# Patient Record
Sex: FEMALE | Race: WHITE | Employment: STUDENT | URBAN - METROPOLITAN AREA
[De-identification: names, ages, dates, MRNs, and addresses within clinical notes are randomized per-mention and may not be internally consistent; named-entity substitution may affect disease eponyms.]

---

## 2019-08-20 ENCOUNTER — ALLIED HEALTH/NURSE VISIT (OUTPATIENT)
Dept: FAMILY MEDICINE | Facility: CLINIC | Age: 24
End: 2019-08-20
Payer: COMMERCIAL

## 2019-08-20 DIAGNOSIS — F41.9 ANXIETY: Primary | ICD-10-CM

## 2019-08-20 PROCEDURE — 99207 ZZC NO CHARGE NURSE ONLY: CPT

## 2019-08-20 NOTE — PROGRESS NOTES
S-(situation): patient is walk in to clinic accompanied by self. Last night went to [place] [facility] and nurse talked with her she says.  She says did not see a doctor due to insurance.    She went there because she felt palpitations and numbness in left thumb area.  She says BP was high.  Systolic was 180 and 144.  Does not remember diastolic.  She is not taking any meds.  She feels fine today  She wants doctor to tell her she is OK.  She admits to bit of anxiety    B-(background):  She is from [facility].  Her English is good.  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      A-(assessment): palpitations yesterday, anxiety, admitted by her    R-(recommendations): advised needs to be seen.  Looks like she made an appointment  Britney Elizondo RN